# Patient Record
Sex: MALE | Race: WHITE | Employment: OTHER | URBAN - METROPOLITAN AREA
[De-identification: names, ages, dates, MRNs, and addresses within clinical notes are randomized per-mention and may not be internally consistent; named-entity substitution may affect disease eponyms.]

---

## 2018-03-29 ENCOUNTER — HOSPITAL ENCOUNTER (OUTPATIENT)
Dept: ULTRASOUND IMAGING | Age: 73
Discharge: HOME | End: 2018-03-29
Attending: FAMILY MEDICINE | Admitting: FAMILY MEDICINE

## 2018-03-29 DIAGNOSIS — R31.9 HEMATURIA, UNSPECIFIED TYPE: ICD-10-CM

## 2018-03-29 DIAGNOSIS — R31.9 HEMATURIA: ICD-10-CM

## 2018-11-28 ENCOUNTER — HOSPITAL ENCOUNTER (OUTPATIENT)
Dept: ULTRASOUND IMAGING | Age: 73
Discharge: HOME | End: 2018-11-28
Payer: COMMERCIAL

## 2018-11-28 DIAGNOSIS — E11.65 UNCONTROLLED TYPE 2 DIABETES MELLITUS WITH HYPERGLYCEMIA (HCC): ICD-10-CM

## 2018-11-28 PROCEDURE — 76775 US EXAM ABDO BACK WALL LIM: CPT

## 2020-08-24 ENCOUNTER — OFFICE VISIT (OUTPATIENT)
Dept: ORTHOPEDIC SURGERY | Age: 75
End: 2020-08-24
Payer: COMMERCIAL

## 2020-08-24 VITALS — WEIGHT: 220 LBS | BODY MASS INDEX: 31.5 KG/M2 | HEIGHT: 70 IN

## 2020-08-24 PROBLEM — M65.341 TRIGGER RING FINGER OF RIGHT HAND: Status: ACTIVE | Noted: 2020-08-24

## 2020-08-24 PROCEDURE — 20550 NJX 1 TENDON SHEATH/LIGAMENT: CPT | Performed by: ORTHOPAEDIC SURGERY

## 2020-08-24 PROCEDURE — 99203 OFFICE O/P NEW LOW 30 MIN: CPT | Performed by: ORTHOPAEDIC SURGERY

## 2020-08-24 RX ORDER — BETAMETHASONE SODIUM PHOSPHATE AND BETAMETHASONE ACETATE 3; 3 MG/ML; MG/ML
6 INJECTION, SUSPENSION INTRA-ARTICULAR; INTRALESIONAL; INTRAMUSCULAR; SOFT TISSUE ONCE
Status: COMPLETED | OUTPATIENT
Start: 2020-08-24 | End: 2020-08-24

## 2020-08-24 RX ORDER — LIDOCAINE HYDROCHLORIDE 10 MG/ML
1 INJECTION, SOLUTION INFILTRATION; PERINEURAL ONCE
Status: COMPLETED | OUTPATIENT
Start: 2020-08-24 | End: 2020-08-24

## 2020-08-24 RX ADMIN — LIDOCAINE HYDROCHLORIDE 1 ML: 10 INJECTION, SOLUTION INFILTRATION; PERINEURAL at 15:33

## 2020-08-24 RX ADMIN — BETAMETHASONE SODIUM PHOSPHATE AND BETAMETHASONE ACETATE 6 MG: 3; 3 INJECTION, SUSPENSION INTRA-ARTICULAR; INTRALESIONAL; INTRAMUSCULAR; SOFT TISSUE at 15:33

## 2020-08-24 NOTE — PROGRESS NOTES
CC: Right hand pain    HISTORY OF PRESENT ILLNESS:   Phoebe Rueda is a 76 y.o. male right-hand-dominant, nondiabetic, who presents for evaluation and treatment of right ring finger triggering that began approximately 3 months ago. This affects daily activities involving gripping. Treatment to date has been NSAID's, without significant relief. Symptoms have worsened recently. Patient is having locking. He has  weakness. He denies numbness.     Medical History:  Past Medical History:   Diagnosis Date    Arthritis     osteoarthritis    Diabetes mellitus     Hypertension      Past Surgical History:   Procedure Laterality Date    BACK SURGERY  2004    CHOLECYSTECTOMY      COLONOSCOPY  2012    polyp    HIATAL HERNIA REPAIR      NECK SURGERY   &     TONSILLECTOMY       Family History   Problem Relation Age of Onset    Heart Disease Father      Social History     Socioeconomic History    Marital status:      Spouse name: Not on file    Number of children: Not on file    Years of education: Not on file    Highest education level: Not on file   Occupational History    Not on file   Social Needs    Financial resource strain: Not on file    Food insecurity     Worry: Not on file     Inability: Not on file    Transportation needs     Medical: Not on file     Non-medical: Not on file   Tobacco Use    Smoking status: Former Smoker     Years: 40.00     Last attempt to quit: 2000     Years since quittin.7   Substance and Sexual Activity    Alcohol use: No    Drug use: Not on file    Sexual activity: Not on file   Lifestyle    Physical activity     Days per week: Not on file     Minutes per session: Not on file    Stress: Not on file   Relationships    Social connections     Talks on phone: Not on file     Gets together: Not on file     Attends [employer] service: Not on file     Active member of club or organization: Not on file     Attends meetings of clubs or triggering with                                                   flexion/extension, and no locking    Neurological:  Sensation is subjectively normal in hands bilaterally    Radiology:         IMPRESSION AND PLAN: Right ring finger trigger digit    I discussed the entity of trigger finger with the patient. I fully outlined the mechanics behind the triggering and locking and appropriate conservative and surgical options. All their questions were answered fully. At this point the patient is symptomatic, and injection was recommended. Patient was in agreement, hopeful to avoid surgery. The risks and benefits of cortisone injection were discussed thoroughly. Risks discussed included infection, adverse drug reaction, increased pain post-injection, skin de-pigmentation, fatty atrophy, and persistent clinical symptoms despite injection. Use of ethyl chloride spray during injection to decrease injection site pain was discussed. The injection was given after cleansing the skin with alcohol. Right ring finger trigger digit and flexor tendon sheath injected with 1 cc of 1% lidocaine without epinephrine and 1 cc of Celestone without difficulty. The patient tolerated the injection well and a Band-aid was placed. Finger splint at night for 3 weeks and activity modifications during the day. Follow-up if symptoms not resolved. All questions and concerns were addressed today. Patient is in agreement with the plan. Krista Jordan MD  Hand & Upper Extremity Surgery  [address]  A partner of [facility] ([facility])        Please note that this transcription was created using voice recognition software. Any errors are unintentional and may be due to voice recognition transcription.

## 2021-03-15 ENCOUNTER — HOSPITAL ENCOUNTER (OUTPATIENT)
Age: 76
Discharge: HOME | End: 2021-03-15
Payer: COMMERCIAL

## 2021-03-15 ENCOUNTER — HOSPITAL ENCOUNTER (OUTPATIENT)
Dept: GENERAL RADIOLOGY | Age: 76
Discharge: HOME | End: 2021-03-15
Payer: COMMERCIAL

## 2021-03-15 DIAGNOSIS — R52 PAIN: ICD-10-CM

## 2021-03-15 PROCEDURE — 72100 X-RAY EXAM L-S SPINE 2/3 VWS: CPT

## 2021-10-04 ENCOUNTER — HOSPITAL ENCOUNTER (OUTPATIENT)
Age: 76
Discharge: HOME | End: 2021-10-04
Payer: COMMERCIAL

## 2021-10-04 ENCOUNTER — HOSPITAL ENCOUNTER (OUTPATIENT)
Dept: GENERAL RADIOLOGY | Age: 76
Discharge: HOME | End: 2021-10-04
Payer: COMMERCIAL

## 2021-10-04 DIAGNOSIS — M54.50 LOW BACK PAIN, UNSPECIFIED BACK PAIN LATERALITY, UNSPECIFIED CHRONICITY, UNSPECIFIED WHETHER SCIATICA PRESENT: ICD-10-CM

## 2021-10-04 PROCEDURE — 72100 X-RAY EXAM L-S SPINE 2/3 VWS: CPT

## 2023-04-21 ENCOUNTER — HOSPITAL ENCOUNTER (OUTPATIENT)
Age: 78
Setting detail: SPECIMEN
Discharge: HOME | End: 2023-04-21
Payer: COMMERCIAL

## 2023-04-21 DIAGNOSIS — M48.061 SPINAL STENOSIS OF LUMBAR REGION, UNSPECIFIED WHETHER NEUROGENIC CLAUDICATION PRESENT: ICD-10-CM

## 2023-04-21 DIAGNOSIS — M54.12 CERVICAL RADICULOPATHY: ICD-10-CM

## 2023-04-21 DIAGNOSIS — M51.36 DDD (DEGENERATIVE DISC DISEASE), LUMBAR: ICD-10-CM

## 2023-04-21 DIAGNOSIS — Z98.1 S/P CERVICAL SPINAL FUSION: ICD-10-CM

## 2023-04-21 DIAGNOSIS — M54.50 LUMBAR PAIN: ICD-10-CM

## 2023-04-21 PROCEDURE — 82565 ASSAY OF CREATININE: CPT

## 2023-04-21 PROCEDURE — 36415 COLL VENOUS BLD VENIPUNCTURE: CPT

## 2023-04-21 PROCEDURE — 84520 ASSAY OF UREA NITROGEN: CPT

## 2023-04-22 LAB
BUN SERPL-MCNC: 12 MG/DL (ref 7–20)
CREAT SERPL-MCNC: 1 MG/DL (ref 0.8–1.3)
GFR SERPLBLD CREATININE-BSD FMLA CKD-EPI: >60 ML/MIN/{1.73_M2}

## 2023-06-26 ENCOUNTER — OFFICE VISIT (OUTPATIENT)
Dept: ORTHOPEDIC SURGERY | Age: 78
End: 2023-06-26
Payer: COMMERCIAL

## 2023-06-26 VITALS — BODY MASS INDEX: 28.63 KG/M2 | HEIGHT: 70 IN | WEIGHT: 200 LBS

## 2023-06-26 DIAGNOSIS — M48.061 SPINAL STENOSIS OF LUMBAR REGION, UNSPECIFIED WHETHER NEUROGENIC CLAUDICATION PRESENT: ICD-10-CM

## 2023-06-26 DIAGNOSIS — M51.36 DDD (DEGENERATIVE DISC DISEASE), LUMBAR: ICD-10-CM

## 2023-06-26 DIAGNOSIS — Z98.1 S/P CERVICAL SPINAL FUSION: Primary | ICD-10-CM

## 2023-06-26 DIAGNOSIS — M54.50 LUMBAR PAIN: ICD-10-CM

## 2023-06-26 DIAGNOSIS — M54.12 CERVICAL RADICULOPATHY: ICD-10-CM

## 2023-06-26 PROCEDURE — 99214 OFFICE O/P EST MOD 30 MIN: CPT | Performed by: PHYSICIAN ASSISTANT

## 2023-06-26 PROCEDURE — 1123F ACP DISCUSS/DSCN MKR DOCD: CPT | Performed by: PHYSICIAN ASSISTANT

## 2023-06-29 ENCOUNTER — TELEPHONE (OUTPATIENT)
Dept: ORTHOPEDIC SURGERY | Age: 78
End: 2023-06-29

## 2023-06-29 NOTE — TELEPHONE ENCOUNTER
General Question     Subject: REQ A CALL BACK  Patient and /or Facility Request: Ej Stringer  Contact Number: [phone]      PATIENT CALLED IN TO SEE IF HE CAN SPEAK TO SOMEONE IN THE OFFICE. Gian Wright PATIENT STATE THAT DR. GARCIA IS NOT IN THE OFFICE. HE CAN SEE AN PA. Gian Wright AND DR. GARCIA DON'T HAVE ALL HIS RECORDS. Gian Wright     PLEASE ADVISE

## 2023-08-10 ENCOUNTER — OFFICE VISIT (OUTPATIENT)
Dept: ENT CLINIC | Age: 78
End: 2023-08-10
Payer: COMMERCIAL

## 2023-08-10 VITALS
DIASTOLIC BLOOD PRESSURE: 80 MMHG | SYSTOLIC BLOOD PRESSURE: 137 MMHG | HEART RATE: 78 BPM | HEIGHT: 70 IN | TEMPERATURE: 97.8 F | WEIGHT: 198.6 LBS | BODY MASS INDEX: 28.43 KG/M2

## 2023-08-10 DIAGNOSIS — M48.02 CERVICAL STENOSIS OF SPINE: Primary | ICD-10-CM

## 2023-08-10 PROCEDURE — 1123F ACP DISCUSS/DSCN MKR DOCD: CPT | Performed by: OTOLARYNGOLOGY

## 2023-08-10 PROCEDURE — 99204 OFFICE O/P NEW MOD 45 MIN: CPT | Performed by: OTOLARYNGOLOGY

## 2023-08-10 ASSESSMENT — ENCOUNTER SYMPTOMS
SORE THROAT: 0
CHOKING: 0
COUGH: 0
SINUS PAIN: 0
DIARRHEA: 0
FACIAL SWELLING: 0
SHORTNESS OF BREATH: 0
SINUS PRESSURE: 0
EYE ITCHING: 0
VOICE CHANGE: 0
EYE PAIN: 0
NAUSEA: 0
EYE REDNESS: 0
TROUBLE SWALLOWING: 0
RHINORRHEA: 0

## 2023-08-10 NOTE — PROGRESS NOTES
Subjective:      Patient ID: Gabriel Frausto is a 66 y.o. male. HPI  Chief Complaint   Patient presents with    cervical stenosis       History of Present Illness  Head/Neck    Gian Murphy is a(n) 66 y.o. male who presents with a long history of cervical spine disease. Had prior C4-5. Two operations. Now with new numbness and weakness in right arm. Needs C2-3,3-4. Here for surgical consultation. I have been asked to participate in approach.      Patient Active Problem List   Diagnosis    Trigger ring finger of right hand     Past Surgical History:   Procedure Laterality Date    BACK SURGERY  2004    CHOLECYSTECTOMY      COLONOSCOPY  2012    polyp    HIATAL HERNIA REPAIR      NECK SURGERY   &     TONSILLECTOMY       Family History   Problem Relation Age of Onset    Heart Disease Father      Social History     Socioeconomic History    Marital status:      Spouse name: Not on file    Number of children: Not on file    Years of education: Not on file    Highest education level: Not on file   Occupational History    Not on file   Tobacco Use    Smoking status: Former     Years: 40.00     Types: Cigarettes     Quit date: 2000     Years since quittin.7    Smokeless tobacco: Not on file   Substance and Sexual Activity    Alcohol use: No    Drug use: Not on file    Sexual activity: Not on file   Other Topics Concern    Not on file   Social History Narrative    Not on file     Social Determinants of Health     Financial Resource Strain: Not on file   Food Insecurity: Not on file   Transportation Needs: Not on file   Physical Activity: Inactive    Days of Exercise per Week: 0 days    Minutes of Exercise per Session: 0 min   Stress: Not on file   Social Connections: Not on file   Intimate Partner Violence: Not At Risk    Fear of Current or Ex-Partner: No    Emotionally Abused: No    Physically Abused: No    Sexually Abused: No   Housing Stability: Not on file       DRUG/FOOD ALLERGIES: Percocet

## 2023-08-29 ENCOUNTER — OUTSIDE SERVICES (OUTPATIENT)
Dept: OTOLARYNGOLOGY | Age: 78
End: 2023-08-29

## 2023-08-29 DIAGNOSIS — M48.02 CERVICAL SPINAL STENOSIS: Primary | ICD-10-CM

## 2023-09-06 NOTE — PROGRESS NOTES
Patient Name: Teresa Blanco  YOB: 1945  Medical Record Number:  <[id number]>  Billing Number:    Date of Procedure: 9/6/2023  Time: * No surgery found *      Indications: The patient is a pleasant 66year old male with a history of cervical spine disease. He has been seen by Dr. Lion Borjas, examined and imaging reviewed. The decision was made to go to the operating room for an ACDF. The risks, benefits, and indications for surgery were provided to the patient by Dr. Lion Borjas and they agreed to proceed. Pre-operative Dx:  1. Anterior cervical spine disease levels C3-5 with removal of prior plate. Post-operative Dx:  1. same       Procedure:  1. ACDF levels C3-5. 37939 and 69276 1 units  Surgeon: Mayda Wallace Md, PhD  * Surgery not found *  Anesthesia:  GETA  EBL:   10 ml on approach  Specimen:  None  Findings:  Non Contributory  Complications  none  Antibiotics  2g Ancef    DETAILS OF PROCEDURE:  The patient came to the operating room, was placed in  supine position on the operating room table. General IV anesthesia was given  until a deep plane of anesthesia was obtained. At that point, an  endotracheal tube was placed by anesthesiology service without difficulty. The patient was then prepped and draped in routine fashion. Surgery began with a horizontal incision on the right. This was at the mid  level of the neck. This was carried down into the subcutaneous tissues with  a 15 blade. Bovie cautery was used to cauterize down to the level of the  platysma. Subplatysmal planes were then elevated superiorly and inferiorly  and a Weitlaner was placed. Fibrofatty tissue was then incised in a vertical  fashion. This allowed palpation of the carotid sheath and the pharynx and  larynx. Kitner dissection was then performed down to the spine. The  pharynx, larynx and esophagus were then medialized with retractors.   The  carotid was lateralized with retractors and between the paraspinal

## 2024-02-21 ENCOUNTER — OFFICE VISIT (OUTPATIENT)
Dept: SURGERY | Age: 79
End: 2024-02-21
Payer: COMMERCIAL

## 2024-02-21 VITALS
BODY MASS INDEX: 27.86 KG/M2 | TEMPERATURE: 98.8 F | HEIGHT: 70 IN | HEART RATE: 88 BPM | RESPIRATION RATE: 16 BRPM | WEIGHT: 194.6 LBS | DIASTOLIC BLOOD PRESSURE: 65 MMHG | SYSTOLIC BLOOD PRESSURE: 126 MMHG | OXYGEN SATURATION: 97 %

## 2024-02-21 DIAGNOSIS — E11.69 TYPE 2 DIABETES MELLITUS WITH OTHER SPECIFIED COMPLICATION, UNSPECIFIED WHETHER LONG TERM INSULIN USE (HCC): ICD-10-CM

## 2024-02-21 DIAGNOSIS — S21.209A WOUND OF BACK, UNSPECIFIED LATERALITY, INITIAL ENCOUNTER: Primary | ICD-10-CM

## 2024-02-21 PROCEDURE — 99204 OFFICE O/P NEW MOD 45 MIN: CPT | Performed by: SURGERY

## 2024-02-21 PROCEDURE — 1123F ACP DISCUSS/DSCN MKR DOCD: CPT | Performed by: SURGERY

## 2024-02-21 NOTE — PROGRESS NOTES
cosmetic outcome, scarring,  partial or total flap loss, VTE(DVT/PE), and death were discussed.     Gerard Hercules MD  [facility]  02/21/24

## 2024-02-22 LAB
EST. AVERAGE GLUCOSE BLD GHB EST-MCNC: 154.2 MG/DL
HBA1C MFR BLD: 7 %

## 2024-02-26 ENCOUNTER — HOSPITAL ENCOUNTER (OUTPATIENT)
Dept: ULTRASOUND IMAGING | Age: 79
Discharge: HOME | End: 2024-02-26
Payer: COMMERCIAL

## 2024-02-26 DIAGNOSIS — S21.209A WOUND OF BACK, UNSPECIFIED LATERALITY, INITIAL ENCOUNTER: ICD-10-CM

## 2024-02-26 PROCEDURE — 76999 ECHO EXAMINATION PROCEDURE: CPT

## 2024-02-28 ENCOUNTER — TELEPHONE (OUTPATIENT)
Dept: SURGERY | Age: 79
End: 2024-02-28

## 2024-02-28 NOTE — TELEPHONE ENCOUNTER
----- Message from Gerard Hercules MD sent at 2/27/2024  6:18 PM EST -----  No collection seen.  Will see how he does over the course of the next 2 weeks. If not resolved, then will plan for debridement and closure.    Thanks!  NK

## 2024-02-28 NOTE — TELEPHONE ENCOUNTER
Spoke with patient to give test results , and scheduled patient for 2 week follow up appointment to see Dr. Hercules on 3/13/2024

## 2024-03-13 ENCOUNTER — OFFICE VISIT (OUTPATIENT)
Dept: SURGERY | Age: 79
End: 2024-03-13
Payer: COMMERCIAL

## 2024-03-13 VITALS
DIASTOLIC BLOOD PRESSURE: 74 MMHG | RESPIRATION RATE: 16 BRPM | BODY MASS INDEX: 27.68 KG/M2 | SYSTOLIC BLOOD PRESSURE: 119 MMHG | HEART RATE: 65 BPM | TEMPERATURE: 97.9 F | WEIGHT: 190.2 LBS | OXYGEN SATURATION: 98 %

## 2024-03-13 DIAGNOSIS — S21.209A WOUND OF BACK, UNSPECIFIED LATERALITY, INITIAL ENCOUNTER: Primary | ICD-10-CM

## 2024-03-13 PROCEDURE — 1123F ACP DISCUSS/DSCN MKR DOCD: CPT | Performed by: SURGERY

## 2024-03-13 PROCEDURE — 99213 OFFICE O/P EST LOW 20 MIN: CPT | Performed by: SURGERY

## 2024-03-13 NOTE — PROGRESS NOTES
[facility]    CC: Back wound    Consulting physician: Prince Rosario MD    HPI:79 y.o.male with a PMHx as delineated below who presents in consultation for a nonhealing back wound. He underwent lumbar laminectomy with Dr. Rosario (L2-L3) on 2/7/24. He initially did well, however has now noted to have significant drainage and discomfort. His wife noted fevers and chills last week. Given these issues, he was referred to plastic surgery for evaluation and treatment.     Since his last evaluation, he underwent US evaluation without evidence of a large fluid collection.    History of the wound and wound care is as follows:    Ambulatory: Yes  Likely etiology: multifactorial  Incontinent: No  Age of onset: 79  Prior interventions: None  Pressure offloading therapies: None  Date of most recent procedure: 2/7/24  Flaps in the past: None  Nutrition: tolerates PO    PMHx:   Past Medical History:   Diagnosis Date    Arthritis     osteoarthritis    Diabetes mellitus (HCC)     Hypertension    HgbA1c - PENDING    PSHx:   Past Surgical History:   Procedure Laterality Date    BACK SURGERY  2004    CHOLECYSTECTOMY      COLONOSCOPY  11/28/2012    polyp    HIATAL HERNIA REPAIR      NECK SURGERY  1994 & 1995    TONSILLECTOMY       ALLERGIES:   Allergies   Allergen Reactions    Percocet [Oxycodone-Acetaminophen]     Morphine Nausea And Vomiting     FHx: Reviewed and is noncontributory    Meds:   Current Outpatient Medications   Medication Sig Dispense Refill    TRULICITY 0.75 MG/0.5ML SOPN INJECT (0.75MG) INTO SKIN BY SUBCUTANEOUS ROUTE EVERY WEEK      citalopram (CELEXA) 40 MG tablet Take 1 tablet by mouth daily      lisinopril (PRINIVIL;ZESTRIL) 20 MG tablet Take 1 tablet by mouth daily      lovastatin (MEVACOR) 20 MG tablet Take 1 tablet by mouth 2 times daily      metformin (GLUCOPHAGE) 500 MG tablet Take 1 tablet by mouth 2 times daily (with meals)      ibuprofen (ADVIL;MOTRIN) 200 MG tablet Take 1